# Patient Record
Sex: MALE | Race: WHITE | Employment: OTHER | URBAN - METROPOLITAN AREA
[De-identification: names, ages, dates, MRNs, and addresses within clinical notes are randomized per-mention and may not be internally consistent; named-entity substitution may affect disease eponyms.]

---

## 2020-06-12 ENCOUNTER — HOSPITAL ENCOUNTER (EMERGENCY)
Age: 45
Discharge: HOME | End: 2020-06-12
Attending: EMERGENCY MEDICINE
Payer: OTHER GOVERNMENT

## 2020-06-12 VITALS
BODY MASS INDEX: 26.66 KG/M2 | DIASTOLIC BLOOD PRESSURE: 86 MMHG | WEIGHT: 160 LBS | OXYGEN SATURATION: 100 % | HEART RATE: 73 BPM | TEMPERATURE: 97.5 F | HEIGHT: 65 IN | RESPIRATION RATE: 14 BRPM | SYSTOLIC BLOOD PRESSURE: 120 MMHG

## 2020-06-12 DIAGNOSIS — S39.012A BACK STRAIN, INITIAL ENCOUNTER: Primary | ICD-10-CM

## 2020-06-12 PROCEDURE — 74011000250 HC RX REV CODE- 250: Performed by: EMERGENCY MEDICINE

## 2020-06-12 PROCEDURE — 74011250637 HC RX REV CODE- 250/637: Performed by: EMERGENCY MEDICINE

## 2020-06-12 PROCEDURE — 99283 EMERGENCY DEPT VISIT LOW MDM: CPT

## 2020-06-12 RX ORDER — ACETAMINOPHEN 500 MG
1000 TABLET ORAL
Qty: 30 TAB | Refills: 0 | Status: SHIPPED | OUTPATIENT
Start: 2020-06-12

## 2020-06-12 RX ORDER — NAPROXEN 500 MG/1
500 TABLET ORAL
Qty: 20 TAB | Refills: 0 | Status: SHIPPED | OUTPATIENT
Start: 2020-06-12 | End: 2020-06-22

## 2020-06-12 RX ORDER — LIDOCAINE 4 G/100G
1 PATCH TOPICAL EVERY 24 HOURS
Status: DISCONTINUED | OUTPATIENT
Start: 2020-06-12 | End: 2020-06-12 | Stop reason: HOSPADM

## 2020-06-12 RX ORDER — NAPROXEN 250 MG/1
500 TABLET ORAL
Status: COMPLETED | OUTPATIENT
Start: 2020-06-12 | End: 2020-06-12

## 2020-06-12 RX ORDER — ONDANSETRON 4 MG/1
4 TABLET, ORALLY DISINTEGRATING ORAL
Status: COMPLETED | OUTPATIENT
Start: 2020-06-12 | End: 2020-06-12

## 2020-06-12 RX ORDER — MORPHINE SULFATE 15 MG/1
7.5 TABLET ORAL
Qty: 6 TAB | Refills: 0 | Status: SHIPPED | OUTPATIENT
Start: 2020-06-12 | End: 2020-06-15

## 2020-06-12 RX ORDER — ONDANSETRON 4 MG/1
4 TABLET, ORALLY DISINTEGRATING ORAL
Qty: 12 TAB | Refills: 0 | Status: SHIPPED | OUTPATIENT
Start: 2020-06-12

## 2020-06-12 RX ORDER — MORPHINE SULFATE 15 MG/1
15 TABLET ORAL
Status: COMPLETED | OUTPATIENT
Start: 2020-06-12 | End: 2020-06-12

## 2020-06-12 RX ORDER — CYCLOBENZAPRINE HCL 5 MG
5 TABLET ORAL
Qty: 12 TAB | Refills: 0 | Status: SHIPPED | OUTPATIENT
Start: 2020-06-12 | End: 2020-06-17

## 2020-06-12 RX ORDER — LIDOCAINE 50 MG/G
PATCH TOPICAL
Qty: 15 EACH | Refills: 0 | Status: SHIPPED | OUTPATIENT
Start: 2020-06-12

## 2020-06-12 RX ADMIN — ONDANSETRON 4 MG: 4 TABLET, ORALLY DISINTEGRATING ORAL at 05:40

## 2020-06-12 RX ADMIN — MORPHINE SULFATE 15 MG: 15 TABLET ORAL at 05:40

## 2020-06-12 RX ADMIN — NAPROXEN 500 MG: 250 TABLET ORAL at 05:40

## 2020-06-12 NOTE — ED NOTES
Pt states slight relief of pain but able states he is able to move much easier then earlier. Pt given prescription with verbal medication information  Pt given verbal and written d/c instructions. pt taken to waiting area per w/c then stated he wanted to walk to car.

## 2020-06-12 NOTE — ED TRIAGE NOTES
Patient reports to ED c/c left lower back pain. Pt reports injury during weight lifting yesterday. Also reports hx of back problems.

## 2020-06-12 NOTE — ED PROVIDER NOTES
EMERGENCY DEPARTMENT HISTORY AND PHYSICAL EXAM    Date: 6/12/2020  Patient Name: Easton Ro    History of Presenting Illness     Chief Complaint   Patient presents with    Back Pain         History Provided By: Patient    [id number]  Easton Ro is a 41 y.o. male with PMHX of chronic back pain who presents to the emergency department C/O back injury. Patient reports that he was doing back lives with 20 pound dumbbells when he felt a pop in his back and had pain this afternoon. He reports he took some Tylenol, muscle relaxement and ibuprofen and he had some improvement of his pain throughout the day however when he woke up this morning in bed he had severe back pain difficulty moving took him to ER. No neurologic symptoms or weakness. Patient followed at the spine center due to his back pain and arthritis. PCP: Alina Flaherty MD    Current Facility-Administered Medications   Medication Dose Route Frequency Provider Last Rate Last Dose    morphine IR (MS IR) tablet 15 mg  15 mg Oral ONCE PRN Ki Palacio MD        ondansetron (ZOFRAN ODT) tablet 4 mg  4 mg Oral NOW Ki Palacio MD        naproxen (NAPROSYN) tablet 500 mg  500 mg Oral NOW Ki Palacio MD        lidocaine 4 % patch 1 Patch  1 Patch TransDERmal Q24H Ki Palacio MD         Current Outpatient Medications   Medication Sig Dispense Refill    morphine IR (MS IR) 15 mg tablet Take 0.5 Tabs by mouth every four (4) hours as needed for Pain (Breakthrough pain) for up to 3 days. Max Daily Amount: 45 mg. 6 Tab 0    ondansetron (Zofran ODT) 4 mg disintegrating tablet Take 1 Tab by mouth every eight (8) hours as needed for Nausea for up to 12 doses. 12 Tab 0    lidocaine (Lidoderm) 5 % Apply patch to the affected area for 12 hours a day and remove for 12 hours a day. 15 Each 0    cyclobenzaprine (FLEXERIL) 5 mg tablet Take 1 Tab by mouth three (3) times daily as needed for Muscle Spasm(s) for up to 5 days.  12 Tab 0    naproxen (Naprosyn) 500 mg tablet Take 1 Tab by mouth two (2) times daily as needed for Pain for up to 10 days. 20 Tab 0    acetaminophen (TYLENOL) 500 mg tablet Take 2 Tabs by mouth every eight (8) hours as needed for Pain. 30 Tab 0       Past History     Past Medical History:  History reviewed. No pertinent past medical history. Past Surgical History:  Past Surgical History:   Procedure Laterality Date    HX HERNIA REPAIR      HX ORTHOPAEDIC      HX VASECTOMY      HX WISDOM TEETH EXTRACTION         Family History:  History reviewed. No pertinent family history. Social History:  Social History     Tobacco Use    Smoking status: Former Smoker     Last attempt to quit: 2000     Years since quittin.0    Smokeless tobacco: Never Used   Substance Use Topics    Alcohol use: Yes    Drug use: Not on file       Allergies:  No Known Allergies      Review of Systems   Review of Systems   Gastrointestinal: Negative for diarrhea. Genitourinary: Negative for difficulty urinating. Musculoskeletal: Positive for back pain. Neurological: Negative for weakness and numbness. All other systems reviewed and are negative. Physical Exam     Vitals:    20 0453   BP: 120/86   Pulse: 73   Resp: 14   Temp: 97.5 °F (36.4 °C)   SpO2: 100%   Weight: 72.6 kg (160 lb)   Height: 5' 5\" (1.651 m)     Physical Exam  Vitals signs and nursing note reviewed. Constitutional:       Appearance: Normal appearance. He is well-developed. Comments: Fit 61-year-old male laying back in bed with hips and knees flex onto bed   HENT:      Head: Normocephalic and atraumatic. Eyes:      Extraocular Movements: Extraocular movements intact. Pupils: Pupils are equal, round, and reactive to light. Neck:      Musculoskeletal: Normal range of motion. Cardiovascular:      Rate and Rhythm: Normal rate and regular rhythm. Pulmonary:      Effort: Pulmonary effort is normal. No respiratory distress. Musculoskeletal: Normal range of motion. General: No deformity. Cervical back: Normal.      Thoracic back: Normal.      Lumbar back: He exhibits tenderness. He exhibits no bony tenderness, no pain and no spasm. Back:       Comments: Able to passively and actively range lower extremities without radiation of pain. Worsening pain with extension of legs. Skin:     General: Skin is warm and dry. Capillary Refill: Capillary refill takes less than 2 seconds. Neurological:      General: No focal deficit present. Mental Status: He is alert and oriented to person, place, and time. Mental status is at baseline. Sensory: Sensation is intact. Motor: Motor function is intact. No weakness. Coordination: Coordination is intact. Psychiatric:         Mood and Affect: Mood normal.         Behavior: Behavior normal.               Diagnostic Study Results     Labs -   No results found for this or any previous visit (from the past 12 hour(s)). Radiologic Studies -   No orders to display     CT Results  (Last 48 hours)    None        CXR Results  (Last 48 hours)    None          Medications given in the ED-  Medications   morphine IR (MS IR) tablet 15 mg (has no administration in time range)   ondansetron (ZOFRAN ODT) tablet 4 mg (has no administration in time range)   naproxen (NAPROSYN) tablet 500 mg (has no administration in time range)   lidocaine 4 % patch 1 Patch (has no administration in time range)         Medical Decision Making   I am the first provider for this patient. I reviewed the vital signs, available nursing notes, past medical history, past surgical history, family history and social history. Vital Signs-Reviewed the patient's vital signs. Pulse Oximetry Analysis - 100% on RA normal         Records Reviewed: Nursing Notes and Old Medical Records    Provider Notes (Medical Decision Making): Scotty Landau is a 41 y.o. male here with atraumatic low back pain. Nonradiating. No red flag symptoms. Pain is worse this morning because patient was sleeping and stiffened up. Will give pain medication in emergency department. Patient's wife drove him to ER. Will prescribe pain regimen and I have discussed avoid prolonged bed rest and mobilization to gradual resumption of activity levels. Patient should not engage in any lifting until cleared by his spine doctor. He will follow-up with his orthopedic spine surgeon. Procedures:  Procedures    ED Course:       Diagnosis and Disposition     Critical Care:     DISCHARGE NOTE:    Catina Fernandes  results have been reviewed with him. He has been counseled regarding his diagnosis, treatment, and plan. He verbally conveys understanding and agreement of the signs, symptoms, diagnosis, treatment and prognosis and additionally agrees to follow up as discussed. He also agrees with the care-plan and conveys that all of his questions have been answered. I have also provided discharge instructions for him that include: educational information regarding their diagnosis and treatment, and list of reasons why they would want to return to the ED prior to their follow-up appointment, should his condition change. He has been provided with education for proper emergency department utilization. CLINICAL IMPRESSION:    1. Back strain, initial encounter        PLAN:  1. D/C Home  2. Current Discharge Medication List      START taking these medications    Details   morphine IR (MS IR) 15 mg tablet Take 0.5 Tabs by mouth every four (4) hours as needed for Pain (Breakthrough pain) for up to 3 days. Max Daily Amount: 45 mg.  Qty: 6 Tab, Refills: 0    Associated Diagnoses: Back strain, initial encounter      ondansetron (Zofran ODT) 4 mg disintegrating tablet Take 1 Tab by mouth every eight (8) hours as needed for Nausea for up to 12 doses.   Qty: 12 Tab, Refills: 0      lidocaine (Lidoderm) 5 % Apply patch to the affected area for 12 hours a day and remove for 12 hours a day. Qty: 15 Each, Refills: 0      cyclobenzaprine (FLEXERIL) 5 mg tablet Take 1 Tab by mouth three (3) times daily as needed for Muscle Spasm(s) for up to 5 days. Qty: 12 Tab, Refills: 0      naproxen (Naprosyn) 500 mg tablet Take 1 Tab by mouth two (2) times daily as needed for Pain for up to 10 days. Qty: 20 Tab, Refills: 0      acetaminophen (TYLENOL) 500 mg tablet Take 2 Tabs by mouth every eight (8) hours as needed for Pain. Qty: 30 Tab, Refills: 0           3. Follow-up Information     Follow up With Specialties Details Why [facility]  Call today          _______________________________      Please note that this dictation was completed with [employer], the computer voice recognition software. Quite often unanticipated grammatical, syntax, homophones, and other interpretive errors are inadvertently transcribed by the computer software. Please disregard these errors. Please excuse any errors that have escaped final proofreading.

## 2020-06-12 NOTE — DISCHARGE INSTRUCTIONS
Please take naproxen and Tylenol as needed for pain. Please take the naproxen with food. Do not take naproxen and ibuprofen together. You can take half a morphine immediate release as needed for breakthrough pain. As we discussed please gradually increase your activity level as comfort allows and avoid prolonged bedrest.  Do not overexert yourself if you are continuing to have pain. No lifting.